# Patient Record
Sex: FEMALE | Race: WHITE | NOT HISPANIC OR LATINO | URBAN - METROPOLITAN AREA
[De-identification: names, ages, dates, MRNs, and addresses within clinical notes are randomized per-mention and may not be internally consistent; named-entity substitution may affect disease eponyms.]

---

## 2024-02-23 ENCOUNTER — PRE VISIT (OUTPATIENT)
Dept: ONCOLOGY | Facility: CLINIC | Age: 49
End: 2024-02-23
Payer: COMMERCIAL

## 2024-02-23 ENCOUNTER — TRANSCRIBE ORDERS (OUTPATIENT)
Dept: OTHER | Age: 49
End: 2024-02-23

## 2024-02-23 ENCOUNTER — PATIENT OUTREACH (OUTPATIENT)
Dept: ONCOLOGY | Facility: CLINIC | Age: 49
End: 2024-02-23
Payer: COMMERCIAL

## 2024-02-23 DIAGNOSIS — C56.9 OVARIAN CANCER (H): Primary | ICD-10-CM

## 2024-02-23 NOTE — PROGRESS NOTES
New Patient Hematology / Oncology Nurse Navigator Note     Referral Date: 2/23/24    Referring provider:   Alvin Pickering MD   [address] [place] [address]   Phone: [phone]   Fax: [phone]     Referring Clinic/Organization: [facility]      Referred to: [facility]    Requested provider (if applicable): First available - did not specify     Evaluation for : Adnexal mass     Clinical History (per Nurse review of records provided):    Office Visit today with OBGYN at [facility]-- BOOKMARKED  Pelvic US today:  IMPRESSION:   1. Both ovaries are markedly enlarged are replaced by a heterogeneous mixed solid and cystic masses. The multiple cystic spaces  thick septations. There mild internal vascularity in the thick septations. Differential considerations include cystic ovarian neoplasm with bilateral ovarian involvement as well endometriosis with bilateral adnexal involvement. Infectious inflammatory etiologies with bilateral tubo-ovarian abscess    would be in the differential.. Recommend OB/GYN consultation. MRI of the pelvis with contrast might be useful to further characterize   2. Fibroid uterus with multiple fibroids. The 2 largest were marked and measured as above.  -- BOOKMARKED  CT Abd/Pelvis yesterday showing:  IMPRESSION:    Bilateral multiloculated cystic lesions involving both adnexa suspicious for neoplasm. Small volume of free fluid along the liver, left paracolic gutter and interloop fluid with scattered punctate nodularity throughout the peritoneum raising concern for peritoneal implants. Alternatively, pelvic inflammatory disease with peritonitis could be considered in the appropriate clinical setting but considered slightly less likely. Recommend correlation with pelvic ultrasound and additional evaluation with pelvic contrast-enhanced MRI may be helpful.   Indeterminate 7 mm lesion in the liver dome is too small to characterize. Attention on follow-up imaging suggested.  --  BOOKMARKED    Clinical Assessment / Barriers to Care (Per Nurse):  Pt lives in [place], [place]      Records Location: Care Everywhere     Records Needed:   Records from [facility] per protocol (images)    Additional testing needed prior to consult:   Tumor markers drawn, pending    Referral updates and Plan:   OUTGOING CALL to pt, no answer.     LVM introducing my role as nurse navigator with [facility] and that we have recd the referral to GynOnc from Dr Pickering    Explained to pt that he/she will receive a call from our scheduling intake team and provided our call-back number below if needed.       Mora Kellogg, BSN, RN, PHN, OCN  Hematology/Oncology Nurse Navigator  [facility]  [phone]

## 2024-02-23 NOTE — TELEPHONE ENCOUNTER
Imaging Received  February 26, 2024 11:45 AM ABT   Action: Images from [facility] received and resolved to PACS.    2:49 PM  Image from [facility] received and resolved to PACS     Action February 23, 2024 4:38 PM ABT   Action Taken Called [facility] Radiology, they close @ 4PM instructed to send fax for images.    Called [facility] and LVM requesting images to be pushed to PACS     RECORDS STATUS - ALL OTHER DIAGNOSIS      RECORDS RECEIVED FROM: [facility]   DATE RECEIVED:    NOTES STATUS DETAILS   OFFICE NOTE from referring provider [facility] 02/23/24: Dr. Pickering   DISCHARGE REPORT from the ER [facility]:  02/22/24: Dr. Wily Stout   MEDICATION LIST [facility]    LABS     PATHOLOGY REPORTS     ANYTHING RELATED TO DIAGNOSIS [facility] Most recent 02/23/24   IMAGING (NEED IMAGES & REPORT)     CT SCANS [facility]:  02/22/24: CT AP   ULTRASOUND [facility]:  02/23/24, 04/21/22: US pelvic    [facility]:  08/15/23: US TV

## 2024-02-23 NOTE — TELEPHONE ENCOUNTER
Action Taken  Date/Description  TJ     WT from [facility] February 23, 2024 3:30 PM    Call from Pt to schedule for Dx of:  Possible Ovarian cancer (H) [C56.9]   Referred by: Dr. Pickering of [facility] ([facility])   Pt Dx'd @  [facility] and [facility]  Has Pt been anywhere else for this condition/concern? No  Records updated in Care Everywhere? Yes  Prior history of Hematologist, Oncologist?  No  Previous Radiation:  No  Genetic Testing conducted?  No  Surgical procedures, biopsies? NO - Pt had suspicious imaging and the Hysterectomy was postponed for a referral here  Imaging done @: [facility] and [facility]    Any scheduled follow up's/imaging/surgical procedures/biopsies? No

## 2024-04-14 ENCOUNTER — HEALTH MAINTENANCE LETTER (OUTPATIENT)
Age: 49
End: 2024-04-14

## 2025-04-19 ENCOUNTER — HEALTH MAINTENANCE LETTER (OUTPATIENT)
Age: 50
End: 2025-04-19